# Patient Record
Sex: MALE | Race: WHITE
[De-identification: names, ages, dates, MRNs, and addresses within clinical notes are randomized per-mention and may not be internally consistent; named-entity substitution may affect disease eponyms.]

---

## 2018-05-22 ENCOUNTER — HOSPITAL ENCOUNTER (EMERGENCY)
Dept: HOSPITAL 26 - MED | Age: 48
Discharge: HOME | End: 2018-05-22
Payer: COMMERCIAL

## 2018-05-22 VITALS — DIASTOLIC BLOOD PRESSURE: 81 MMHG | SYSTOLIC BLOOD PRESSURE: 121 MMHG

## 2018-05-22 VITALS — SYSTOLIC BLOOD PRESSURE: 120 MMHG | DIASTOLIC BLOOD PRESSURE: 82 MMHG

## 2018-05-22 VITALS — BODY MASS INDEX: 28.89 KG/M2 | HEIGHT: 73 IN | WEIGHT: 218 LBS

## 2018-05-22 DIAGNOSIS — K52.9: Primary | ICD-10-CM

## 2018-06-09 ENCOUNTER — HOSPITAL ENCOUNTER (INPATIENT)
Dept: HOSPITAL 26 - MED | Age: 48
LOS: 1 days | Discharge: HOME | DRG: 144 | End: 2018-06-10
Attending: INTERNAL MEDICINE | Admitting: INTERNAL MEDICINE
Payer: COMMERCIAL

## 2018-06-09 VITALS — SYSTOLIC BLOOD PRESSURE: 157 MMHG | DIASTOLIC BLOOD PRESSURE: 100 MMHG

## 2018-06-09 VITALS — HEIGHT: 73 IN | WEIGHT: 215 LBS | BODY MASS INDEX: 28.49 KG/M2

## 2018-06-09 DIAGNOSIS — R07.81: Primary | ICD-10-CM

## 2018-06-09 DIAGNOSIS — I10: ICD-10-CM

## 2018-06-09 NOTE — NUR
48/M CAME IN RT SIDED CHEST PAIN RADIATING TO NECK, NONPROVOKED, X 1300 TODAY. 
HEART SOUNDS NORMAL, SINUS TACH ON MONITOR. PT NONDIAPHORETIC, SKIN IS WARM AND 
DRY.  DENIES N/V, SOB. REPORTS COUGH X 2 DAYS. DENIES OTHER PMH/RX/OTC

## 2018-06-10 VITALS — DIASTOLIC BLOOD PRESSURE: 77 MMHG | SYSTOLIC BLOOD PRESSURE: 109 MMHG

## 2018-06-10 VITALS — SYSTOLIC BLOOD PRESSURE: 111 MMHG | DIASTOLIC BLOOD PRESSURE: 81 MMHG

## 2018-06-10 VITALS — DIASTOLIC BLOOD PRESSURE: 78 MMHG | SYSTOLIC BLOOD PRESSURE: 118 MMHG

## 2018-06-10 VITALS — SYSTOLIC BLOOD PRESSURE: 123 MMHG | DIASTOLIC BLOOD PRESSURE: 77 MMHG

## 2018-06-10 LAB
ALBUMIN FLD-MCNC: 3.9 G/DL (ref 3.4–5)
ANION GAP SERPL CALCULATED.3IONS-SCNC: 14.3 MMOL/L (ref 8–16)
APPEARANCE UR: CLEAR
AST SERPL-CCNC: 17 U/L (ref 15–37)
BASOPHILS # BLD AUTO: 0 K/UL (ref 0–0.22)
BASOPHILS NFR BLD AUTO: 0.3 % (ref 0–2)
BILIRUB SERPL-MCNC: 0.4 MG/DL (ref 0–1)
BILIRUB UR QL STRIP: NEGATIVE
BUN SERPL-MCNC: 13 MG/DL (ref 7–18)
CHLORIDE SERPL-SCNC: 104 MMOL/L (ref 98–107)
CHOLEST/HDLC SERPL: 5.4 {RATIO} (ref 1–4.5)
CO2 SERPL-SCNC: 25 MMOL/L (ref 21–32)
COLOR UR: YELLOW
CREAT SERPL-MCNC: 1.2 MG/DL (ref 0.7–1.3)
EOSINOPHIL # BLD AUTO: 0.2 K/UL (ref 0–0.4)
EOSINOPHIL NFR BLD AUTO: 1.8 % (ref 0–4)
ERYTHROCYTE [DISTWIDTH] IN BLOOD BY AUTOMATED COUNT: 13.5 % (ref 11.6–13.7)
GFR SERPL CREATININE-BSD FRML MDRD: 82 ML/MIN (ref 90–?)
GLUCOSE SERPL-MCNC: 161 MG/DL (ref 74–106)
GLUCOSE UR STRIP-MCNC: NEGATIVE MG/DL
HCT VFR BLD AUTO: 47.9 % (ref 36–52)
HDLC SERPL-MCNC: 35 MG/DL (ref 40–60)
HGB BLD-MCNC: 15.8 G/DL (ref 12–18)
HGB UR QL STRIP: NEGATIVE
LDLC SERPL CALC-MCNC: 116 MG/DL (ref 60–100)
LEUKOCYTE ESTERASE UR QL STRIP: NEGATIVE
LYMPHOCYTES # BLD AUTO: 2 K/UL (ref 2–11.5)
LYMPHOCYTES NFR BLD AUTO: 16.6 % (ref 20.5–51.1)
MCH RBC QN AUTO: 29 PG (ref 27–31)
MCHC RBC AUTO-ENTMCNC: 33 G/DL (ref 33–37)
MCV RBC AUTO: 88.6 FL (ref 80–94)
MONOCYTES # BLD AUTO: 1 K/UL (ref 0.8–1)
MONOCYTES NFR BLD AUTO: 8.1 % (ref 1.7–9.3)
NEUTROPHILS # BLD AUTO: 8.9 K/UL (ref 1.8–7.7)
NEUTROPHILS NFR BLD AUTO: 73.2 % (ref 42.2–75.2)
NITRITE UR QL STRIP: NEGATIVE
PH UR STRIP: 7.5 [PH] (ref 5–9)
PLATELET # BLD AUTO: 347 K/UL (ref 140–450)
POTASSIUM SERPL-SCNC: 3.3 MMOL/L (ref 3.5–5.1)
RBC # BLD AUTO: 5.41 MIL/UL (ref 4.2–6.1)
RBC #/AREA URNS HPF: (no result) /HPF (ref 0–5)
SODIUM SERPL-SCNC: 140 MMOL/L (ref 136–145)
TRIGL SERPL-MCNC: 198 MG/DL (ref 30–150)
WBC # BLD AUTO: 12.1 K/UL (ref 4.8–10.8)
WBC,URINE: (no result) /HPF (ref 0–5)

## 2018-06-10 NOTE — NUR
DR. LEVY VISITED AND SPOKE TO THE PT. NO SIGN OF DISTRESS NOTED ON THE PT AND HE RESPONDS 
APPROPRIATELY TO THE MD. WILL CONTINUE TO MONITOR.

## 2018-06-10 NOTE — NUR
RECEIVED REPORT FROM ER NURSE AT BEDSIDE FOR CONTINUITY OF CARE. PT AAOX4 IV NOTED LAC 18G 
SALINE LOCK. BED LOWERED CALL LIGHT WITHIN REACH WILL CONTINUE TO MONITOR.

## 2018-06-10 NOTE — NUR
PT IS AWAKE AND WIFE ON THE BEDSIDE. MEDICATIONS GIVEN AND PT TOLERATED IT. NO SIGN OF 
DISTRESS NOTED AND WILL CONTINUE TO MONITOR.

## 2018-06-10 NOTE — NUR
ACKNOWLEDGED A DISCHARGE ORDER FROM DR. LEVY FOR THE PT ONCE MEDICALLY CLEARED BY 
CARDIOLOGIST, DR. YO.

## 2018-06-10 NOTE — NUR
RECEIVED PT FROM NIGHT SHIFT NURSEPAOLA, PT IS AWAKE WITH WIFE AT THE BEDSIDE, PT IS ON 
O2 AT 2L VIA NC AND AN IV LINE AT LEFT AC G.18 IN SALINE LOCK, INTACT. NO SIGN OF DISTRESS 
NOTED ON THE PT AND WILL CONTINUE TO MONITOR.

## 2018-06-10 NOTE — NUR
Patient will be admitted to [facility]. Admited to TELE.  Will go to room 
106B. Belongings list completed. BEDSIDE  Report to PAOLA CAZARES.

## 2018-06-10 NOTE — NUR
PT IS AWAKE AND WIFE ON THE BEDSIDE, VITAL SIGNS TAKEN AND NO SIGN OF DISTRESS NOTED. 
RESPIRATIONS EVEN. CALL LIGHT WITHIN REACH AND WILL CONTINUE TO MONITOR.

## 2018-06-10 NOTE — NUR
DISCHARGED PT VIA WHEELCHAIR WITH THE FAMILY, IV LINE AND ARM BAND REMOVED. DISCHARGE 
INSTRUCTIONS GIVEN. PT IS STABLE AT THIS TIME.

## 2018-06-10 NOTE — NUR
PT IS AWAKE AND SEATED ON THE BED, VITAL SIGNS TAKEN AND IS STABLE. NO SIGN OF DISTRESS 
NOTED. WILL MONITOR.

## 2018-06-11 NOTE — NUR
RETRO REVIEW   ER REPORT, H&P AND CONSULT FAXED TO [facility]   [phone]   PHONE [phone]    
 NO DISCHARGE SUMMARY.

## 2019-01-29 ENCOUNTER — HOSPITAL ENCOUNTER (EMERGENCY)
Dept: HOSPITAL 26 - MED | Age: 49
Discharge: HOME | End: 2019-01-29
Payer: COMMERCIAL

## 2019-01-29 VITALS — SYSTOLIC BLOOD PRESSURE: 111 MMHG | DIASTOLIC BLOOD PRESSURE: 67 MMHG

## 2019-01-29 VITALS — BODY MASS INDEX: 27.83 KG/M2 | WEIGHT: 210 LBS | HEIGHT: 73 IN

## 2019-01-29 VITALS — SYSTOLIC BLOOD PRESSURE: 105 MMHG | DIASTOLIC BLOOD PRESSURE: 79 MMHG

## 2019-01-29 DIAGNOSIS — L50.9: Primary | ICD-10-CM

## 2019-01-29 NOTE — NUR
pt resting comfortably in [facility] at this time. rr even and unlabored, 
lungs bl clear. safety precautions in place. will continue to monitor.

## 2019-01-29 NOTE — NUR
49 YO M BIB WIFE W/ C/O PRURITUS, GENERALIZED HIVES X LAST NIGHT. PT STATES HE 
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 
COME INTO CONTACT WITH. NO RESPIRATORY DISTRESS NOTED, NO ACCESSORY MUSCLE USE 
NOTED. SPEAKING IN FULL, COMPLETE, AND APPROPRIATE SENTENCES. DENIES SOB. NO 
TONGUE/LIP/ FACIAL SWELLING NOTED. AAOX4, GCS 15. CMS INTACT. RR EVEN AND 
UNLABORED, LUNGS BL CLEAR. ABD SOFT, NON-TENDER. ER MD NOTIFIED OF PT STATUS. 
PT NEEDS MET. SAFETY PRCEAUTIONS IN PLACE. WILL CONTINUE TO MONITOR.

## 2019-01-29 NOTE — NUR
Patient discharged with v/s stable. Written and verbal after care instructions 
given and explained. 

Patient alert, oriented and verbalized understanding of instructions. 
Ambulatory with steady gait. All questions addressed prior to discharge. ID 
band removed. Patient advised to follow up with PMD. Rx of Claritin and Pepcid 
given. Patient educated on indication of medication including possible reaction 
and side effects. Opportunity to ask questions provided and answered.

## 2019-09-07 ENCOUNTER — HOSPITAL ENCOUNTER (EMERGENCY)
Dept: HOSPITAL 26 - MED | Age: 49
Discharge: HOME | End: 2019-09-07
Payer: COMMERCIAL

## 2019-09-07 VITALS — HEIGHT: 73 IN | BODY MASS INDEX: 28.63 KG/M2 | WEIGHT: 216 LBS

## 2019-09-07 VITALS — SYSTOLIC BLOOD PRESSURE: 133 MMHG | DIASTOLIC BLOOD PRESSURE: 72 MMHG

## 2019-09-07 VITALS — SYSTOLIC BLOOD PRESSURE: 137 MMHG | DIASTOLIC BLOOD PRESSURE: 88 MMHG

## 2019-09-07 DIAGNOSIS — M54.2: ICD-10-CM

## 2019-09-07 DIAGNOSIS — M54.81: Primary | ICD-10-CM

## 2019-09-07 PROCEDURE — 99284 EMERGENCY DEPT VISIT MOD MDM: CPT

## 2019-09-07 PROCEDURE — 96372 THER/PROPH/DIAG INJ SC/IM: CPT

## 2019-09-07 PROCEDURE — 70450 CT HEAD/BRAIN W/O DYE: CPT

## 2019-09-07 NOTE — NUR
Patient discharged with v/s stable. Written and verbal after care instructions 
given and explained. 

Patient alert, oriented and verbalized understanding of instructions. 
Ambulatory with steady gait. All questions addressed prior to discharge. ID 
band removed. Patient advised to follow up with PMD. Rx of valium, naproxen 
given. Patient educated on indication of medication including possible reaction 
and side effects. Opportunity to ask questions provided and answered.

## 2019-09-07 NOTE — NUR
C/O RIGHT SIDED HEADACHE AND LT POSTERIOR NECK PAIN X TODAY. DENIES INJURY. 
PAIN 5/10. IBUPROFEN AT 1600. PATIENT STATES HE OVERWORKS. DENIES VISUAL 
CHANGES, N/V. PT IS CHRONICALLY FATIGUED FROM WORK. PERRLA. EQUAL HAND  
STRENGTH. LT NECK TENDER TO PALPATION. 



PMH- DENIES

RX-DENIES

## 2020-08-31 ENCOUNTER — HOSPITAL ENCOUNTER (EMERGENCY)
Dept: HOSPITAL 26 - MED | Age: 50
Discharge: HOME | End: 2020-08-31
Payer: COMMERCIAL

## 2020-08-31 VITALS — SYSTOLIC BLOOD PRESSURE: 142 MMHG | DIASTOLIC BLOOD PRESSURE: 88 MMHG

## 2020-08-31 VITALS — WEIGHT: 210 LBS | HEIGHT: 73 IN | BODY MASS INDEX: 27.83 KG/M2

## 2020-08-31 VITALS — SYSTOLIC BLOOD PRESSURE: 104 MMHG | DIASTOLIC BLOOD PRESSURE: 66 MMHG

## 2020-08-31 DIAGNOSIS — R55: Primary | ICD-10-CM

## 2020-08-31 DIAGNOSIS — E86.0: ICD-10-CM

## 2020-08-31 DIAGNOSIS — R19.7: ICD-10-CM

## 2020-08-31 DIAGNOSIS — F41.9: ICD-10-CM

## 2020-08-31 LAB
ANION GAP SERPL CALCULATED.3IONS-SCNC: 15.1 MMOL/L (ref 8–16)
BASOPHILS # BLD AUTO: 0.1 K/UL (ref 0–0.22)
BASOPHILS NFR BLD AUTO: 0.7 % (ref 0–2)
BUN SERPL-MCNC: 15 MG/DL (ref 7–18)
CHLORIDE SERPL-SCNC: 103 MMOL/L (ref 98–107)
CO2 SERPL-SCNC: 25 MMOL/L (ref 21–32)
CREAT SERPL-MCNC: 1 MG/DL (ref 0.6–1.3)
EOSINOPHIL # BLD AUTO: 0.1 K/UL (ref 0–0.4)
EOSINOPHIL NFR BLD AUTO: 1.5 % (ref 0–4)
ERYTHROCYTE [DISTWIDTH] IN BLOOD BY AUTOMATED COUNT: 13 % (ref 11.6–13.7)
GFR SERPL CREATININE-BSD FRML MDRD: 101 ML/MIN (ref 90–?)
GLUCOSE SERPL-MCNC: 90 MG/DL (ref 74–106)
HCT VFR BLD AUTO: 51 % (ref 36–52)
HGB BLD-MCNC: 17 G/DL (ref 12–18)
LYMPHOCYTES # BLD AUTO: 1.3 K/UL (ref 2–11.5)
LYMPHOCYTES NFR BLD AUTO: 16 % (ref 20.5–51.1)
MCH RBC QN AUTO: 30 PG (ref 27–31)
MCHC RBC AUTO-ENTMCNC: 33 G/DL (ref 33–37)
MCV RBC AUTO: 89.8 FL (ref 80–94)
MONOCYTES # BLD AUTO: 0.8 K/UL (ref 0.8–1)
MONOCYTES NFR BLD AUTO: 9.2 % (ref 1.7–9.3)
NEUTROPHILS # BLD AUTO: 6 K/UL (ref 1.8–7.7)
NEUTROPHILS NFR BLD AUTO: 72.6 % (ref 42.2–75.2)
PLATELET # BLD AUTO: 335 K/UL (ref 140–450)
POTASSIUM SERPL-SCNC: 4.1 MMOL/L (ref 3.5–5.1)
RBC # BLD AUTO: 5.67 MIL/UL (ref 4.2–6.1)
SODIUM SERPL-SCNC: 139 MMOL/L (ref 136–145)
WBC # BLD AUTO: 8.2 K/UL (ref 4.8–10.8)

## 2020-08-31 PROCEDURE — 36415 COLL VENOUS BLD VENIPUNCTURE: CPT

## 2020-08-31 PROCEDURE — 96360 HYDRATION IV INFUSION INIT: CPT

## 2020-08-31 PROCEDURE — 99285 EMERGENCY DEPT VISIT HI MDM: CPT

## 2020-08-31 PROCEDURE — 93005 ELECTROCARDIOGRAM TRACING: CPT

## 2020-08-31 PROCEDURE — 80048 BASIC METABOLIC PNL TOTAL CA: CPT

## 2020-08-31 PROCEDURE — 85025 COMPLETE CBC W/AUTO DIFF WBC: CPT

## 2020-08-31 PROCEDURE — 71045 X-RAY EXAM CHEST 1 VIEW: CPT

## 2020-08-31 PROCEDURE — 84484 ASSAY OF TROPONIN QUANT: CPT

## 2020-08-31 NOTE — NUR
/o sudden onset of dizziness and lue paresthesia upon getting to working----f/u 
mild pain like some one jumping on chest---no accessery , skin dry, pink warm 
to touch